# Patient Record
Sex: FEMALE | Race: BLACK OR AFRICAN AMERICAN | NOT HISPANIC OR LATINO | URBAN - NONMETROPOLITAN AREA
[De-identification: names, ages, dates, MRNs, and addresses within clinical notes are randomized per-mention and may not be internally consistent; named-entity substitution may affect disease eponyms.]

---

## 2019-03-27 ENCOUNTER — IMPORTED ENCOUNTER (OUTPATIENT)
Dept: URBAN - NONMETROPOLITAN AREA CLINIC 1 | Facility: CLINIC | Age: 39
End: 2019-03-27

## 2019-03-27 PROBLEM — E11.9: Noted: 2019-03-27

## 2019-03-27 PROBLEM — H52.13: Noted: 2019-03-27

## 2019-03-27 PROBLEM — Z79.4: Noted: 2019-03-27

## 2019-03-27 PROCEDURE — 92310 CONTACT LENS FITTING OU: CPT

## 2019-03-27 PROCEDURE — S0621 ROUTINE OPHTHALMOLOGICAL EXA: HCPCS

## 2019-03-27 NOTE — PATIENT DISCUSSION
"DM s DR-Stressed the importance of keeping blood sugars under control and regular visits with PCP. -Explained the possible effects of poorly controlled diabetes and the damage that diabetes can cause to ocular health. -Pt instructed to contact our office with any vision changes. ""Myopia-Discussed diagnosis with patient. -Explained that people who are myopic are at a higher risk for developing RD/RT and reviewed associated S&S.-Pt to contact our office if symptoms develop. Updated spec Rx given. Recommend lens that will provide comfort as well as protect safety and health of eyes. CL wear-CLs fit and center well.-Stressed that patient should not sleep in CL. -Updated CL Rx given. -CL care and precautions given. "

## 2021-11-03 ENCOUNTER — IMPORTED ENCOUNTER (OUTPATIENT)
Dept: URBAN - NONMETROPOLITAN AREA CLINIC 1 | Facility: CLINIC | Age: 41
End: 2021-11-03

## 2021-11-03 PROCEDURE — 92015 DETERMINE REFRACTIVE STATE: CPT

## 2021-11-03 PROCEDURE — 92014 COMPRE OPH EXAM EST PT 1/>: CPT

## 2022-04-10 ASSESSMENT — TONOMETRY
OS_IOP_MMHG: 16
OD_IOP_MMHG: 16
OS_IOP_MMHG: 16
OD_IOP_MMHG: 16

## 2022-04-10 ASSESSMENT — VISUAL ACUITY
OS_SC: 20/30
OS_SC: 20/20-2
OD_SC: 20/25-1
OD_SC: 20/40

## 2022-08-31 PROBLEM — O36.4XX0 IUFD AT 20 WEEKS OR MORE OF GESTATION: Status: ACTIVE | Noted: 2022-08-31

## 2023-01-31 RX ORDER — INSULIN ASPART 100 [IU]/ML
INJECTION, SOLUTION INTRAVENOUS; SUBCUTANEOUS PRN
COMMUNITY

## 2023-01-31 RX ORDER — ALBUTEROL SULFATE 2.5 MG/3ML
SOLUTION RESPIRATORY (INHALATION)
COMMUNITY

## 2023-01-31 RX ORDER — FLUTICASONE PROPIONATE AND SALMETEROL 100; 50 UG/1; UG/1
1 POWDER RESPIRATORY (INHALATION) EVERY 12 HOURS
COMMUNITY

## 2024-05-10 ENCOUNTER — ESTABLISHED PATIENT (OUTPATIENT)
Dept: RURAL CLINIC 1 | Facility: CLINIC | Age: 44
End: 2024-05-10

## 2024-05-10 DIAGNOSIS — E11.9: ICD-10-CM

## 2024-05-10 DIAGNOSIS — Z79.4: ICD-10-CM

## 2024-05-10 DIAGNOSIS — H52.13: ICD-10-CM

## 2024-05-10 PROCEDURE — S0621AEC ROUTINE OPH EXAM INCLUDES REF/ EST PATIENT

## 2024-05-10 PROCEDURE — 92310-E CONTACT LENS FITTING ESTABLISH PATIENT

## 2024-05-10 ASSESSMENT — TONOMETRY
OD_IOP_MMHG: 12
OS_IOP_MMHG: 12

## 2024-05-10 ASSESSMENT — VISUAL ACUITY
OS_CC: 20/20
OS_CC: 20/40
OD_CC: 20/40